# Patient Record
Sex: FEMALE | Race: WHITE
[De-identification: names, ages, dates, MRNs, and addresses within clinical notes are randomized per-mention and may not be internally consistent; named-entity substitution may affect disease eponyms.]

---

## 2023-07-01 ENCOUNTER — HOSPITAL ENCOUNTER (EMERGENCY)
Dept: HOSPITAL 47 - EC | Age: 33
LOS: 2 days | Discharge: HOME | End: 2023-07-03
Payer: COMMERCIAL

## 2023-07-01 DIAGNOSIS — Z88.0: ICD-10-CM

## 2023-07-01 DIAGNOSIS — F29: Primary | ICD-10-CM

## 2023-07-01 PROCEDURE — 99284 EMERGENCY DEPT VISIT MOD MDM: CPT

## 2023-07-01 PROCEDURE — 82075 ASSAY OF BREATH ETHANOL: CPT

## 2023-07-01 PROCEDURE — 96372 THER/PROPH/DIAG INJ SC/IM: CPT

## 2023-07-01 NOTE — ED
Psych HPI





<Jairo Norton - Last Filed: 07/03/23 15:31>





- General


Source: EMS


Mode of arrival: EMS





- History of Present Illness


MD Complaint: other


-: hour(s)


Associated Psychiatric Symptoms: visual hallucinations, delusions


Quality: constant


Improves With: none


Worsens With: none


Associated Symptoms: denies other symptoms





<Tam Sheriff - Last Filed: 07/11/23 07:19>





- General


Chief Complaint: Psychiatric Symptoms


Stated Complaint: Petition


Time Seen by Provider: 07/01/23 14:43





- History of Present Illness


Initial Comments: 





This patient is a 33-year-old woman brought by police to have evaluation after 

she reportedly was telling them that she was seeing her dead relatives standing 

in the road.  The person has been DC'd for 2 years approximately.  On arrival, 

the patient does appear very paranoid and is not forthcoming with me about this 

episode. (Tam Sheriff)





- Related Data


                                Home Medications











 Medication  Instructions  Recorded  Confirmed


 


No Known Home Medications  07/01/23 07/01/23











                                    Allergies











Allergy/AdvReac Type Severity Reaction Status Date / Time


 


Penicillins Allergy  Unknown Verified 07/01/23 15:58














Review of Systems


ROS Other: All systems not noted in ROS Statement are negative.





<Jairo Norton - Last Filed: 07/03/23 15:31>


ROS Other: All systems not noted in ROS Statement are negative.


Constitutional: Denies: fever, chills


Eyes: Denies: vision change


Respiratory: Denies: cough, dyspnea


Cardiovascular: Denies: chest pain, palpitations, edema, syncope


Gastrointestinal: Denies: abdominal pain, vomiting, diarrhea


Musculoskeletal: Denies: back pain


Skin: Denies: rash


Neurological: Denies: headache


Psychiatric: Reports: anxiety, visual hallucinations





<Tam Sheriff - Last Filed: 07/11/23 07:19>


ROS Statement: 


Those systems with pertinent positive or pertinent negative responses have been 

documented in the HPI.








General Exam


Limitations: altered mental status


General appearance: alert, anxious


Head exam: Present: atraumatic, normocephalic


Eye exam: Present: normal appearance, PERRL, EOMI.  Absent: scleral icterus, 

conjunctival injection


Neck exam: Present: normal inspection, full ROM


Respiratory exam: Present: normal lung sounds bilaterally.  Absent: respiratory 

distress, wheezes, rales, rhonchi, stridor


Cardiovascular Exam: Present: regular rate, normal rhythm, normal heart sounds. 

Absent: systolic murmur, diastolic murmur, rubs, gallop


GI/Abdominal exam: Present: soft.  Absent: distended, tenderness, guarding, 

rebound, rigid, mass


Extremities exam: Present: normal inspection, normal capillary refill.  Absent: 

pedal edema, calf tenderness


Back exam: Present: normal inspection.  Absent: CVA tenderness (R), CVA 

tenderness (L)


Neurological exam: Present: alert, normal gait.  Absent: motor sensory deficit


Psychiatric exam: Present: anxious, manic.  Absent: flat affect, homicidal 

ideation, suicidal ideation


Skin exam: Present: warm, dry, intact, normal color.  Absent: rash





<Tam Shreiff - Last Filed: 07/11/23 07:19>





Course


                                   Vital Signs











  07/01/23 07/02/23 07/02/23





  20:46 06:10 09:35


 


Temperature  97.9 F 


 


Pulse Rate 98 84 


 


Respiratory 15 16 18





Rate   


 


Blood Pressure 104/65 110/78 


 


O2 Sat by Pulse 94 L 97 





Oximetry   














  07/02/23 07/02/23 07/03/23





  11:35 17:07 05:26


 


Temperature   


 


Pulse Rate 98  


 


Respiratory 17 18 18





Rate   


 


Blood Pressure 112/80  


 


O2 Sat by Pulse 95  





Oximetry   














  07/03/23 07/03/23 07/03/23





  06:21 15:26 16:30


 


Temperature  98.1 F 98.2 F


 


Pulse Rate 64 91 82


 


Respiratory 18 18 16





Rate   


 


Blood Pressure 100/64 115/68 118/70


 


O2 Sat by Pulse 98 96 98





Oximetry   














Medical Decision Making





<Tam Sheriff - Last Filed: 07/11/23 07:19>





- Medical Decision Making











Was pt. sent in by a medical professional or institution (, PA, NP, urgent 

care, hospital, or nursing home...) When possible be specific


@  -[No]


Did you speak to anyone other than the patient for history (EMS, parent, family,

 police, friend...)? What history was obtained from this source 


@  -[No]


Did you review nursing and triage notes (agree or disagree)?  Why? 


@  -[I reviewed and agree with nursing and triage notes]


Were old charts reviewed (outside hosp., previous admission, EMS record, old 

EKG, old radiological studies, urgent care reports/EKG's, nursing home records)?

Report findings 


@  -[No old charts were reviewed]


Differential Diagnosis (chest pain, altered mental status, abdominal pain women,

abdominal pain men, vaginal bleeding, weakness, fever, dyspnea, syncope, 

headache, dizziness, GI bleed, back pain, seizure, CVA, palpatations, mental 

health, musculoskeletal)? 


@  -[Differential Mental Health


Depression, anxiety, bipolar, psychosis, schizophrenia, borderline personality, 

situational depression, adjustment disorder, behavioral disorder, brain tumor, 

malingering, substance abuse, encephalopathy, medication reaction, dementia, 

hypothyroidism, degenerative neurologic disorder, lupus.... This is not meant to

 be all-inclusive list


EKG interpreted by me (3pts min.).


@  -[


X-rays interpreted by me (1pt min.).


@  -[None done]


CT interpreted by me (1pt min.).


@  -[None done]


U/S interpreted by me (1pt. min.).


@  -[None done]


What testing was considered but not performed or refused? (CT, X-rays, U/S, 

labs)? Why?


@  -[None]


What meds were considered but not given or refused? Why?


@  -[None]


Did you discuss the management of the patient with other professionals 

(professionals i.e. , PA, NP, lab, RT, psych nurse, , , 

teacher, , )? Give summary


@  -[Case discussed with EPS personnel


Was smoking cessation discussed for >3mins.?


@  -[No]


Was critical care preformed (if so, how long)?


@  -[No]


Were there social determinants of health that impacted care today? How? 

(Homelessness, low income, unemployed, alcoholism, drug addiction, 

transportation, low edu. Level, literacy, decrease access to med. care, FDC, 

rehab)?


@  -[No]


Was there de-escalation of care discussed even if they declined (Discuss DNR or 

withdrawal of care, Hospice)? DNR status


@  -[No]


What co-morbidities impacted this encounter? (DM, HTN, Smoking, COPD, CAD, 

Cancer, CVA, ARF, Chemo, Hep., AIDS, mental health diagnosis, sleep apnea, 

morbid obesity)?


@  -[None]


Was patient admitted / discharged? Hospital course, mention meds given and 

route, prescriptions, significant lab abnormalities, going to OR and other 

pertinent info.


@  -[The patient has been seen by EPS and they feel that she is stable to have 

further treatment as outpatient


Undiagnosed new problem with uncertain prognosis?


@  -[No]


Drug Therapy requiring intensive monitoring for toxicity (Heparin, Nitro, 

Insulin, Cardizem)?


@  -[No]


Were any procedures done?


@  -[No]


Diagnosis/symptom?


@  -[Mood disorder, NOS


Acute, or Chronic, or Acute on Chronic?


@  -[Acute


Uncomplicated (without systemic symptoms) or Complicated (systemic symptoms)?


@  -[Uncomplicated


Side effects of treatment?


@  -[No]


Exacerbation, Progression, or Severe Exacerbation?


@  -[No]


Poses a threat to life or bodily function? How? (Chest pain, USA, MI, pneumonia,

 PE, COPD, DKA, ARF, appy, cholecystitis, CVA, Diverticulitis, Homicidal, 

Suicidal, threat to staff... and all critical care pts)


@  -[No] (Tam Sheriff)





Disposition


Time of Disposition: 15:32





<Jairo Norton - Last Filed: 07/03/23 15:31>


Is patient prescribed a controlled substance at d/c from ED?: No





<Tam Sheriff - Last Filed: 07/11/23 07:19>


Clinical Impression: 


 Psychosis





Disposition: HOME SELF-CARE


Condition: Good


Referrals: 


None,Stated [Primary Care Provider] - 1-2 days

## 2023-07-03 VITALS
HEART RATE: 82 BPM | DIASTOLIC BLOOD PRESSURE: 70 MMHG | SYSTOLIC BLOOD PRESSURE: 118 MMHG | RESPIRATION RATE: 16 BRPM | TEMPERATURE: 98.2 F